# Patient Record
Sex: FEMALE | Race: WHITE | HISPANIC OR LATINO | URBAN - METROPOLITAN AREA
[De-identification: names, ages, dates, MRNs, and addresses within clinical notes are randomized per-mention and may not be internally consistent; named-entity substitution may affect disease eponyms.]

---
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
## 2024-03-26 NOTE — ED PROVIDER NOTE - NSFOLLOWUPINSTRUCTIONS_ED_ALL_ED_FT
Pt have DC order in. DC plan remain Home with ana mariarDenise. Denise is going to hire Private care giver when pt get home as need it. Referral was sent to [facility]. [facility] will follow up for possible Future Longterm Placement if need.   Notify Shy at [facility] pt is getting DC today.     CARA will follow.     Electronically signed by CARA Palacios on 3/26/2024 at 10:03 AM     Rest, drink plenty of fluids  Advance activity as tolerated  Continue all previously prescribed medications as directed  Follow up with your PMD 2-3 days- bring copies of your results  Return to the ER for worsening

## 2024-06-13 NOTE — ED BEHAVIORAL HEALTH ASSESSMENT NOTE - NS ED BHA MED ROS PSYCHIATRIC
Show Applicator Variable?: Yes Render Note In Bullet Format When Appropriate: No Post-Care Instructions: I reviewed with the patient in detail post-care instructions. Patient is to wear sunprotection, and avoid picking at any of the treated lesions. Pt may apply Vaseline to crusted or scabbing areas. Detail Level: Detailed Medical Necessity Information: It is in your best interest to select a reason for this procedure from the list below. All of these items fulfill various CMS LCD requirements except the new and changing color options. Consent: The patient's consent was obtained including but not limited to risks of crusting, scabbing, blistering, scarring, darker or lighter pigmentary change, recurrence, incomplete removal and infection. Medical Necessity Clause: This procedure was medically necessary because the lesions that were treated were: Spray Paint Text: The liquid nitrogen was applied to the skin utilizing a spray paint frosting technique. See HPI

## 2024-07-30 ENCOUNTER — APPOINTMENT (OUTPATIENT)
Dept: OBGYN | Facility: CLINIC | Age: 19
End: 2024-07-30
Payer: COMMERCIAL

## 2024-07-30 ENCOUNTER — TRANSCRIPTION ENCOUNTER (OUTPATIENT)
Age: 19
End: 2024-07-30

## 2024-07-30 VITALS
WEIGHT: 187 LBS | TEMPERATURE: 98.7 F | BODY MASS INDEX: 34.41 KG/M2 | HEIGHT: 62 IN | SYSTOLIC BLOOD PRESSURE: 127 MMHG | OXYGEN SATURATION: 98 % | DIASTOLIC BLOOD PRESSURE: 80 MMHG | HEART RATE: 71 BPM

## 2024-07-30 DIAGNOSIS — Z11.3 ENCOUNTER FOR SCREENING FOR INFECTIONS WITH A PREDOMINANTLY SEXUAL MODE OF TRANSMISSION: ICD-10-CM

## 2024-07-30 DIAGNOSIS — N76.0 ACUTE VAGINITIS: ICD-10-CM

## 2024-07-30 PROCEDURE — 99385 PREV VISIT NEW AGE 18-39: CPT

## 2024-07-30 PROCEDURE — 99459 PELVIC EXAMINATION: CPT

## 2024-07-30 NOTE — PHYSICAL EXAM
[Chaperone Present] : A chaperone was present in the examining room during all aspects of the physical examination [33622] : A chaperone was present during the pelvic exam. [FreeTextEntry2] : Ruthy  [Appropriately responsive] : appropriately responsive [Alert] : alert [No Acute Distress] : no acute distress [Soft] : soft [Non-tender] : non-tender [Non-distended] : non-distended [No HSM] : No HSM [No Lesions] : no lesions [No Mass] : no mass [Oriented x3] : oriented x3 [Examination Of The Breasts] : a normal appearance [No Masses] : no breast masses were palpable [Labia Majora] : normal [Labia Minora] : normal [Discharge] : a  ~M vaginal discharge was present [Moderate] : moderate [Foul Smelling] : not foul smelling [White] : white [Thin] : thin [IUD String] : an IUD string was noted [Normal] : normal [Uterine Adnexae] : normal

## 2024-07-30 NOTE — PLAN
[FreeTextEntry1] : Vaginal culture collected  Recommended boric acid suppository x 7 nights for vaginitis  Discussed safe sex practices such as using condoms at all times to prevent STDs

## 2024-07-30 NOTE — DISCUSSION/SUMMARY
SUN PROTECTION    Sun protection is an important part in decreasing our risk of skin cancers, protecting against age spots, and minimizing wrinkles. We can actively participate in sun protection by avoiding excessive sun exposure and applying sunscreens and wearing sun protective clothing.    I recommend daily use of sunscreens to face and tops of hands. With outdoor activities, sunscreens are recommended to exposed body areas. When applying sunscreen, be sure to use a full ounce (handful amount) to entire body 15-20 minutes prior to sun exposure. Reapply every 2-3 hours depending on your activity. Excessive sweating or activities which involve water require more frequent applications.    I recommend sunscreens which have both UVA and UVB protection. Sun Protection factor (SPF) is a measurement of the UVB blockade. A minimum SPF of 30 is advised. When purchasing a sunscreen, look for a product which contains one of the following active ingredients: Titanium Dioxide, or Zinc Oxide. These ingredients provide the best UV protection.    Another most excellent option is sun protective clothing! Hats and other forms of clothing labeled with the UPF label are options to protect your skin. These can be found through online retailers, as well as some local clothing stores. A good brand to start looking at is Cool[employer]ar.[employer] for examples.    Suggested sunscreens for body:  Banana Boat sunscreen for kids (titanium dioxide and zinc oxide) - great choice for families and comes in a larger bottle and very affordable!  CeraVe Facial Sunscreen SPF 30 - good choice for sensitive skin  Cetaphil Facial Sunscreen SPF 30 - good choice for sensitive skin  Aveeno Continuous Protection with Active Photobarrier Complex Stabilizer  Coppertone Shade Lotion  Eucerin Everyday Protection SPF 30  Vanicream Sunscreen    La Roche Posay Sunscreen (there are a few options all of which work well)  EltaMD Sunscreen  Neutrogena Sunblock with Helioplex  Stabilizer (chemical sunscreen)    Suggested facial sunscreens  Avene Solaire UV Mineral Multi-Defense Tinted Sunscreen Fluid SPF 50+ (darker tint, good for darker skin tones)  Avene Solaire UV Mineral Multi-Defense Sunscreen Fluid SPF 50+  Blue Lizard Face Sunscreen SPF 30+ Broad Spectrum UVA/UVB Protection  CeraVe Hydrating Mineral Sunscreen SPF 50 Face Lotion - EASY TO FIND  CeraVe Hydrating Mineral Sunscreen SPF 30 Face Sheer Tint - EASY TO FIND  Elta MD UV Clear Broad-Spectrum SPF 46  EltaMD UV Clear Broad-Spectrum SPF 46 - Tinted  EltaMD UV Elements (purely physical sunscreen) Tinted Broad-Spectrum SPF 44   Eryfotona Ageless Ultralight Tinted Mineral Sunscreen SPF 50+  Eryfotona Actinica Daily Mineral Sunscreen SPF 50+  Epionce Daily Shield Tinted SPF 50 Sunscreen (my personal favorite, very highly water resistant)  La Roche-Posay Anthelios 50 Tinted Mineral Ultra Fluid Sunscreen  La Roche-Posay Anthelios Melt-In Milk Body & Face Sunscreen   SkinMedica Essential Defense Mineral Shield Broad-Spectrum SPF 32 - Tinted     What to look for on skin that may be worrisome for skin cancer?  Basal cell carcinoma (BCC) usually develops on areas of skin exposed to the sun, especially the head, face and neck, chest, and back. BCC may appear on the skin as:  a sore that doesn't heal or comes back after healing, or bleeds easily  small, smooth and shiny lumps that are pearly white, pink or red  a pink growth with raised edges and indents in the center  a growth that has small blood vessels on the surface    Squamous cell carcinoma  (SCC) usually develops on areas of skin exposed to the sun. SCC may appear on the skin as:  a sore that doesn't heal or comes back after healing  rough or scaly red patches with irregular borders  a sore that is crusty or bleeds easily  a growth or area that is itchy, irritated or sore    What to look for in your moles that are worrisome for melanoma?  Melanoma often looks like a brown or  black mole or birthmark. But melanoma has features that make it different from normal moles and birthmarks. People can remember the abnormal features of melanoma by thinking of the letters A, B, C, D, and E (picture 1):  Asymmetry - One half can look different than the other half.  Border - It can have a jagged or uneven edge.  Color - It can have different colors.  Diameter - It is larger than the eraser on the end of a pencil.  Evolution - Its size, color, or shape can change over time.    Skin affected by melanoma can also bleed or become swollen, red, or crusty.    Many moles and birthmarks are normal and are not melanoma. But if you have a mole or birthmark that you think might be abnormal, show it to your doctor or nurse.    Can melanoma be prevented? -- You can help prevent melanoma by protecting your skin from the sun's rays. Sun exposure and sunburn are a big cause of melanoma. To reduce the chance of getting melanoma, you can:  Stay out of the sun in the middle of the day (from 10 AM to 4 PM)  Wear sunscreen and reapply it often  Wear a wide-brimmed hat, long-sleeved shirt, or long pants  Not use tanning beds. They increase your risk of getting melanoma     SEBORRHEIC KERATOSES    Seborrheic keratoses are very common harmless, often pigmented, growths on the skin.  They are due to a build up of ordinary skin cells.  They can itch, become inflamed, and catch on clothing.  They are not infectious; they can never become a cancer.      Treatment can occur by either freezing them with liquid nitrogen (cryotherapy), or scraping them off (curettage).  This is not a procedure that is typically covered by insurance but can be done cosmetically if desired.      Use a gentle cleanser (such as Cetaphil Daily Facial Cleanser or Cerave Hydrating Cleanser) once to twice daily.     Apply a facial moisturizer as needed. Such products include: Cerave Moisturizing Lotion or Cream, Cerave Facial Moisturizer, Cetaphil Daily  Facial Moisturizer, or Neutrogena Oil-Free Daily Sensitive Skin Face Moisturizer.    POST-CRYOTHERAPY INSTRUCTIONS  (AKA LIQUID NITROGEN TREATMENT)    Your skin has been treated with liquid nitrogen, also known as cryotherapy.   This creates a superficial burn, like frostbite to the skin, which should destroy the underlying lesion.   You can expect the area to be pink and inflamed for about 48 hours.   Around day 3-4 after treatment the treated site will darken, forms a scab or blister, and then slough away.   KEEP VASELINE PETROLEUM JELLY OR AQUAPHOR on the site at all times during this phase, which speeds the healing process.   Use gentle soap and water to cleanse the skin.  Do not pick at any scabs.   Treated skin usually heals within 1-3 weeks.  It may be pink for up to 3 months, and excess pigment is often removed, so the treated areas may be lighter in color than the surrounding skin.      SENSITIVE SKIN CARE    Bathe or shower daily - use warm, NOT HOT, water.  Moisturize twice daily with thick cream moisturizer.  Avoid fabric softeners, use fragrance-free detergents.     Note: These lists are suggestions for fragrance-free products.   Always read ingredients on the label. Be sure that products are fragrance-free.  Avoid products that have “fragrance” as one of the ingredients. Products that are  unscented may still have fragrance added.   Avoid products with added ingredients: Vitamin E, witch hazel, menthol, and acids.  These are some of the products that you could use. If you use other products, be sure to read the ingredient label.    Moisturizers: Use them two times a day, even if skin does not feel dry.    Creams (Good)   CeraVe Moisturizing Cream   Cetaphil® Moisturizing Cream   Aveeno® Eczema Therapy Moisturizing  Cream   Eucerin® Original Moisturizing  Crème   Vanicream Cream®    Do not use lotions. They have alcohol and a lot of water in them.    Skin cleansers: Use only on areas that need to be  [FreeTextEntry1] : I spent the time noted on the day of this patient encounter preparing for, providing and documenting the above service. I have  counseled and educated the patient on the differential, workup, disease course, and treatment/management plan. Education was provided to the patient during this encounter. All questions and concerns were answered and addressed in detail.  Ana Uriostegui NP, FNP-BC  cleaned.   Dove®  Sensitive Skin Beauty Bar    Aveeno®: Skin Relief Body Wash   Aquaphor Gentle Wash and Shampoo   CeraVe Hydrating Cleanser   Vanicream® Cleansing Bar   Cetaphil® Gentle Skin Cleanser/ Gentle Cleansing Bar    Laundry detergent:   All® Free Clear  Tide® Free & Gentle Liquid   Cheer® Free & Gentle  Purex® Free & Clear   Arm & Hammer® Perfume and Dye Free Liquid  Do not use Ivory® ?r Dreft® laundry soap. These both have fragrance in them.  Do not use fabric softeners. The liquid type and fabric sheets have fragrance in them.

## 2024-07-30 NOTE — HISTORY OF PRESENT ILLNESS
[FreeTextEntry1] : Pt is a 18-year-old who presents today for well woman exam. LMP: 7/10/2024 POB: [facility] 2023 PGYN: [facility] IUD 2022, denies vaginal infections  PMH: denies PSH: denies Med: denies All: NKDA  SH: denies FH: Maternal grandmother with kidney cancer  Pt is sexually active with 1 partner and does not use protection at all times. Pt reports episodic vaginal itchiness and irritation for one year.

## 2024-08-05 PROBLEM — N76.0 VAGINITIS: Status: ACTIVE | Noted: 2024-08-05 | Resolved: 2024-09-04

## 2024-08-05 RX ORDER — METRONIDAZOLE 7.5 MG/G
0.75 GEL VAGINAL
Qty: 1 | Refills: 0 | Status: ACTIVE | COMMUNITY
Start: 2024-08-05 | End: 1900-01-01

## 2024-08-05 RX ORDER — FLUCONAZOLE 150 MG/1
150 TABLET ORAL
Qty: 2 | Refills: 1 | Status: ACTIVE | COMMUNITY
Start: 2024-08-05 | End: 1900-01-01

## 2024-08-07 LAB
A VAGINAE DNA VAG QL NAA+PROBE: ABNORMAL
BVAB2 DNA VAG QL NAA+PROBE: NORMAL
C KRUSEI DNA VAG QL NAA+PROBE: NEGATIVE
C KRUSEI DNA VAG QL NAA+PROBE: POSITIVE
C TRACH RRNA SPEC QL NAA+PROBE: NEGATIVE
CANDIDA DNA VAG QL NAA+PROBE: NEGATIVE
MEGA1 DNA VAG QL NAA+PROBE: NORMAL
N GONORRHOEA RRNA SPEC QL NAA+PROBE: NEGATIVE
T VAGINALIS RRNA SPEC QL NAA+PROBE: NEGATIVE

## 2024-08-19 ENCOUNTER — EMERGENCY (EMERGENCY)
Facility: HOSPITAL | Age: 19
LOS: 1 days | Discharge: HOME | End: 2024-08-19
Attending: EMERGENCY MEDICINE | Admitting: EMERGENCY MEDICINE
Payer: COMMERCIAL

## 2024-08-19 VITALS
DIASTOLIC BLOOD PRESSURE: 76 MMHG | SYSTOLIC BLOOD PRESSURE: 124 MMHG | HEART RATE: 95 BPM | RESPIRATION RATE: 18 BRPM | OXYGEN SATURATION: 98 %

## 2024-08-19 VITALS
HEIGHT: 62 IN | SYSTOLIC BLOOD PRESSURE: 129 MMHG | DIASTOLIC BLOOD PRESSURE: 84 MMHG | HEART RATE: 101 BPM | TEMPERATURE: 98 F | OXYGEN SATURATION: 99 % | RESPIRATION RATE: 19 BRPM | WEIGHT: 188.94 LBS

## 2024-08-19 PROCEDURE — 99284 EMERGENCY DEPT VISIT MOD MDM: CPT | Mod: 25

## 2024-08-19 PROCEDURE — 87635 SARS-COV-2 COVID-19 AMP PRB: CPT

## 2024-08-19 PROCEDURE — 99283 EMERGENCY DEPT VISIT LOW MDM: CPT

## 2024-08-19 RX ORDER — BENZONATATE 100 MG/1
1 CAPSULE, LIQUID FILLED ORAL
Qty: 15 | Refills: 0
Start: 2024-08-19 | End: 2024-08-23

## 2024-08-19 NOTE — ED PROVIDER NOTE - OBJECTIVE STATEMENT
Patient reporting intermittent cough for the past 1 week associated with mild rhinorrhea and nasal congestion.  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  Patient comes to the ER for COVID test because she states that her job is requiring it.  Patient denies any shortness of breath.  Patient denies any fever.

## 2024-08-19 NOTE — ED PROVIDER NOTE - CLINICAL SUMMARY MEDICAL DECISION MAKING FREE TEXT BOX
Patient with cough likely from viral syndrome, patient stable for discharge with prescription for Tessalon Perles as trial.

## 2024-08-19 NOTE — ED PROVIDER NOTE - PATIENT PORTAL LINK FT
You can access the FollowMyHealth Patient Portal offered by [facility] by registering at the following website: [email or web address]. By joining [email or web address]’s FollowMyHealth portal, you will also be able to view your health information using other applications (apps) compatible with our system.

## 2024-08-19 NOTE — ED PROVIDER NOTE - PRINCIPAL DIAGNOSIS
1/23/2017        Dear Duncan Ruiz MD,    Thank you for allowing me to participate in your patient's care. We appreciate your confidence in their care for your patient.     The patient will find the results of our examination and our treatment rec
Cough

## 2024-08-19 NOTE — ED PROVIDER NOTE - PHYSICAL EXAMINATION
Gen: alert, NAD  HEENT:  NC/AT, PERRLA, +cough, +rhinorrhea (mild)  CV:  well perfused  Pulm:  normal RR, breathing comfortably, CTA b/l  Abd: s/nt/nd  MSK: moving all extremities  Neuro:  non-focal  Skin:  visualized areas intact  Psych: AOx3

## 2024-08-19 NOTE — ED PROVIDER NOTE - NSFOLLOWUPINSTRUCTIONS_ED_ALL_ED_FT
-- You should update your primary care physician on your Emergency Department visit and follow up with them.  If you do not have a physician or have difficulty following up, please call: [phone] to obtain a [facility] doctor or specialist who can provide follow up.    -- Return to the ER for worsening or persistent symptoms, and/or ANY NEW OR CONCERNING SYMPTOMS.

## 2024-08-20 LAB — SARS-COV-2 RNA SPEC QL NAA+PROBE: SIGNIFICANT CHANGE UP

## 2024-08-23 NOTE — ED PEDIATRIC NURSE NOTE - CHPI ED NUR CONTEXT2
Patient is in the supine position.   The body was positioned using the following devices: safety strap.  The head was positioned using the following devices: regular pillow.  The left arm was positioned using the following devices: arm board.  The right arm was positioned using the following devices: arm board.  The left leg was positioned using the following devices: safety strap.  The right leg was positioned using the following devic es: safety strap. alcohol related/known (describe)

## 2024-09-06 ENCOUNTER — APPOINTMENT (OUTPATIENT)
Dept: OBGYN | Facility: CLINIC | Age: 19
End: 2024-09-06
Payer: COMMERCIAL

## 2024-09-06 VITALS
BODY MASS INDEX: 34.41 KG/M2 | TEMPERATURE: 97.4 F | SYSTOLIC BLOOD PRESSURE: 122 MMHG | WEIGHT: 187 LBS | OXYGEN SATURATION: 99 % | HEART RATE: 83 BPM | HEIGHT: 62 IN | DIASTOLIC BLOOD PRESSURE: 70 MMHG

## 2024-09-06 DIAGNOSIS — N76.0 ACUTE VAGINITIS: ICD-10-CM

## 2024-09-06 LAB
HCG UR QL: NEGATIVE
QUALITY CONTROL: YES

## 2024-09-06 PROCEDURE — 99459 PELVIC EXAMINATION: CPT

## 2024-09-06 PROCEDURE — 99213 OFFICE O/P EST LOW 20 MIN: CPT

## 2024-09-06 RX ORDER — FLUCONAZOLE 150 MG/1
150 TABLET ORAL
Qty: 2 | Refills: 1 | Status: ACTIVE | COMMUNITY
Start: 2024-09-06 | End: 1900-01-01

## 2024-09-06 NOTE — PLAN
[FreeTextEntry1] : Vaginal culture collected  Fluconazole ordered for s/s of yeast infection. Boric acid suppository x 7 nights recommended for vaginitis

## 2024-09-06 NOTE — DISCUSSION/SUMMARY
[FreeTextEntry1] : I spent the time noted on the day of this patient encounter preparing for, providing and documenting the above service. I have  counseled and educated the patient on the differential, workup, disease course, and treatment/management plan. Education was provided to the patient during this encounter. All questions and concerns were answered and addressed in detail.  Ana Uriostegui NP, FNP-BC

## 2024-09-06 NOTE — HISTORY OF PRESENT ILLNESS
[FreeTextEntry1] : Pt is a 18-year-old who reports white odorous vaginal discharge with itchiness for 3 weeks. Pt reports she was treated for BV and yeast infection at the beginning of August and symptoms recurred after completing her cycle. Pt denies urinary symptoms.   Pt is sexually active and uses protection at all times.

## 2024-09-06 NOTE — PHYSICAL EXAM
[Chaperone Present] : A chaperone was present in the examining room during all aspects of the physical examination [[id number]] : A chaperone was present during the pelvic exam. [FreeTextEntry2] : Carissa [Appropriately responsive] : appropriately responsive [Alert] : alert [No Acute Distress] : no acute distress [Soft] : soft [Non-tender] : non-tender [Non-distended] : non-distended [No HSM] : No HSM [No Lesions] : no lesions [No Mass] : no mass [Oriented x3] : oriented x3 [Labia Majora] : normal [Labia Minora] : normal [Erythematous] : erythema [Discharge] : a  ~M vaginal discharge was present [Scant] : scant [Foul Smelling] : not foul smelling [Jacinta] : yellow [Curds] : curd-like [Normal] : normal [Uterine Adnexae] : normal

## 2024-09-09 LAB
CANDIDA VAG CYTO: DETECTED
G VAGINALIS+PREV SP MTYP VAG QL MICRO: DETECTED
T VAGINALIS VAG QL WET PREP: NOT DETECTED